# Patient Record
Sex: MALE | Race: WHITE | NOT HISPANIC OR LATINO | Employment: FULL TIME | URBAN - METROPOLITAN AREA
[De-identification: names, ages, dates, MRNs, and addresses within clinical notes are randomized per-mention and may not be internally consistent; named-entity substitution may affect disease eponyms.]

---

## 2018-01-12 ENCOUNTER — COMMUNICATION - HEALTHEAST (OUTPATIENT)
Dept: TELEHEALTH | Facility: CLINIC | Age: 33
End: 2018-01-12

## 2018-01-12 ENCOUNTER — OFFICE VISIT - HEALTHEAST (OUTPATIENT)
Dept: FAMILY MEDICINE | Facility: CLINIC | Age: 33
End: 2018-01-12

## 2018-01-12 DIAGNOSIS — Z00.00 ROUTINE GENERAL MEDICAL EXAMINATION AT A HEALTH CARE FACILITY: ICD-10-CM

## 2018-01-12 DIAGNOSIS — M79.673 HEEL PAIN: ICD-10-CM

## 2018-01-12 DIAGNOSIS — I83.90 ASYMPTOMATIC VARICOSE VEINS: ICD-10-CM

## 2018-01-12 DIAGNOSIS — Z00.00 HEALTHCARE MAINTENANCE: ICD-10-CM

## 2018-01-12 LAB
CHOLEST SERPL-MCNC: 182 MG/DL
FASTING STATUS PATIENT QL REPORTED: YES
FASTING STATUS PATIENT QL REPORTED: YES
GLUCOSE BLD-MCNC: 96 MG/DL (ref 70–99)
HDLC SERPL-MCNC: 65 MG/DL
LDLC SERPL CALC-MCNC: 109 MG/DL
TRIGL SERPL-MCNC: 41 MG/DL

## 2018-01-12 ASSESSMENT — MIFFLIN-ST. JEOR: SCORE: 1696.96

## 2018-01-15 ENCOUNTER — COMMUNICATION - HEALTHEAST (OUTPATIENT)
Dept: FAMILY MEDICINE | Facility: CLINIC | Age: 33
End: 2018-01-15

## 2018-02-14 ENCOUNTER — OFFICE VISIT - HEALTHEAST (OUTPATIENT)
Dept: VASCULAR SURGERY | Facility: CLINIC | Age: 33
End: 2018-02-14

## 2018-02-14 ENCOUNTER — RECORDS - HEALTHEAST (OUTPATIENT)
Dept: VASCULAR ULTRASOUND | Facility: CLINIC | Age: 33
End: 2018-02-14

## 2018-02-14 DIAGNOSIS — I83.892 VARICOSE VEINS OF LEFT LOWER EXTREMITY WITH OTHER COMPLICATIONS: ICD-10-CM

## 2018-02-14 DIAGNOSIS — I83.892 SYMPTOMATIC VARICOSE VEINS OF LEFT LOWER EXTREMITY: ICD-10-CM

## 2018-11-01 ENCOUNTER — OFFICE VISIT - HEALTHEAST (OUTPATIENT)
Dept: FAMILY MEDICINE | Facility: CLINIC | Age: 33
End: 2018-11-01

## 2018-11-01 DIAGNOSIS — Z71.84 TRAVEL ADVICE ENCOUNTER: ICD-10-CM

## 2019-06-10 ENCOUNTER — OFFICE VISIT - HEALTHEAST (OUTPATIENT)
Dept: FAMILY MEDICINE | Facility: CLINIC | Age: 34
End: 2019-06-10

## 2019-06-10 DIAGNOSIS — J40 BRONCHITIS: ICD-10-CM

## 2019-06-10 ASSESSMENT — MIFFLIN-ST. JEOR: SCORE: 1702.85

## 2019-06-24 ENCOUNTER — COMMUNICATION - HEALTHEAST (OUTPATIENT)
Dept: FAMILY MEDICINE | Facility: CLINIC | Age: 34
End: 2019-06-24

## 2019-07-01 ENCOUNTER — COMMUNICATION - HEALTHEAST (OUTPATIENT)
Dept: FAMILY MEDICINE | Facility: CLINIC | Age: 34
End: 2019-07-01

## 2019-07-24 ENCOUNTER — OFFICE VISIT - HEALTHEAST (OUTPATIENT)
Dept: FAMILY MEDICINE | Facility: CLINIC | Age: 34
End: 2019-07-24

## 2019-07-24 DIAGNOSIS — M25.531 RIGHT WRIST PAIN: ICD-10-CM

## 2019-07-24 NOTE — ASSESSMENT & PLAN NOTE
Exam unremarkable, some snuffbox tenderness.  Decided to refer to hand surgery.  Will defer x-ray to [place].  Discussed my concern about ligament injury and carpal bones versus possible occult navicular fracture.

## 2019-08-02 ENCOUNTER — RECORDS - HEALTHEAST (OUTPATIENT)
Dept: ADMINISTRATIVE | Facility: OTHER | Age: 34
End: 2019-08-02

## 2021-05-29 NOTE — PROGRESS NOTES
[facility] EXAM NOTE      Chief Complaint   Patient presents with     Nasal Congestion     deep chest cold about 1 month, possible allergies       ASSESSMENT & PLAN    Kelby was seen today for nasal congestion.    Diagnoses and all orders for this visit:    Bronchitis: Discussed given his lung findings today and congested cough,  exam and history most consistent with an acute bronchitis.  He is overall stable and well-appearing today.  Pulse ox is 98% and he is afebrile.  Since he has been afebrile and phlegm is clear discussed I do not think that he is appropriate for antibiotics at this time.  Will treat with burst of prednisone for 5 days.  This should take care of the symptoms.  Can continue with Mucinex as well.  Discussed if his symptoms do not improve with this treatment he can call me and I will prescribe a Z-Jermaine but that we try to avoid antibiotic use as able.  Discussed if this was due to allergies I would not expect to have those lung findings and such a persistent congested cough.  He states understanding is agreeable to plan.  Follow-up as needed.  Warning signs and symptoms including high fevers, shortness of breath or worsening cough discussed in detail.  -     predniSONE (DELTASONE) 20 MG tablet; Take 40 mg by mouth daily for 5 days.      HISTORY    Kelby Ortiz is a 33 y.o.  male who presents for the following issues:    Patient reports overall feeling okay but has had on and off cold symptoms for the last month.  Initially started off with upper respiratory cold lots of nasal congestion and then went into his chest chest cold for the first 2 weeks in May.  Then felt better for a week but then the symptoms returned.  Mostly in his chest.  Feeling better over the last week but symptoms persist.  Wife starting to worry why symptoms have been going on for a month.  Wondering about allergies?  He has never had allergies in the past.  He did try Claritin for 10 days in the middle of all the  symptoms and did not seem to do anything.  He does report an increased amount of sneezing.  Otherwise no watery eyes or itchy throat.  Still does have rhinorrhea.  He is taking DayQuil and Mucinex without much relief.  He continues to hear a rattle or wheezing in his lungs when he coughs or and when he takes a deep breath.  That he is felt for about 3 to 4 weeks at this point.  Coughing up mostly clear phlegm to a light yellowish color.  Denies any fevers.  Never got above 99.  No shortness of breath.  This weekend he actually went camping / backpacking and was overexerting himself to follow shortness breath with that but otherwise no shortness of breath.  He has never had symptoms last this long.  Little bit of plugged ears.  No sore throat.  No nausea or vomiting or diarrhea.      MEDICATIONS    Current Outpatient Medications on File Prior to Visit   Medication Sig Dispense Refill     atovaquone-proguanil (MALARONE) 250-100 mg Tab Take 1 tablet by mouth daily with breakfast. Start 2 days prior to departure.  Continue for 7  Days after return. 14 tablet 0     typhoid (VIVOTIF) SR capsule Take 1 capsule by mouth every other day. 4 capsule 0     No current facility-administered medications on file prior to visit.        Pertinent past medical, surgical, social and family history reviewed and updated in [employer].    Social History     Socioeconomic History     Marital status:      Spouse name: Not on file     Number of children: Not on file     Years of education: Not on file     Highest education level: Not on file   Occupational History     Not on file   Social Needs     Financial resource strain: Not on file     Food insecurity:     Worry: Not on file     Inability: Not on file     Transportation needs:     Medical: Not on file     Non-medical: Not on file   Tobacco Use     Smoking status: Never Smoker     Smokeless tobacco: Never Used   Substance and Sexual Activity     Alcohol use: Yes     Drug use: No      "Sexual activity: Not on file   Lifestyle     Physical activity:     Days per week: Not on file     Minutes per session: Not on file     Stress: Not on file   Relationships     Social connections:     Talks on phone: Not on file     Gets together: Not on file     Attends [employer] service: Not on file     Active member of club or organization: Not on file     Attends meetings of clubs or organizations: Not on file     Relationship status: Not on file     Intimate partner violence:     Fear of current or ex partner: Not on file     Emotionally abused: Not on file     Physically abused: Not on file     Forced sexual activity: Not on file   Other Topics Concern     Not on file   Social History Narrative     Not on file         REVIEW OF SYSTEMS     Pertinent Positives and negatives in HPI.     PHYSICAL EXAM    /64 (Patient Site: Left Arm, Patient Position: Sitting, Cuff Size: Adult Regular)   Pulse 60   Temp 98  F (36.7  C) (Oral)   Resp 20   Ht 5' 11\" (1.803 m)   Wt 163 lb 4.8 oz (74.1 kg)   SpO2 99%   BMI 22.78 kg/m    GEN:  33 y.o. male sitting comfortably in no apparent distress.  Speaking full sentences.  Looks well-hydrated.  HEENT: EOMI, no scleral icterus, buccal mucosa moist, no tonsillar erythema or exudate noted.  TMs clear with good cone light reflex.  Right turbinates a little bit pale to the left turbinates are more erythematous and boggy.  Some clear mucus/ mucopurulent congestion noted bilaterally.  Neck: Supple without lymphadenopathy  CHEST/LUNG: No respiratory distress, good air flow to bases, coarse breath sounds throughout with rhonchi noted.  No wheezing or crackles noted.  CV: RRR, S1, S2 normal; no murmurs, rubs or gallops.   SKIN: warm, dry, no rashes or lesions  NEURO: Gait normal, coordination intact  PSYCH:  Mood and affect appropriate      At the end of the encounter, I discussed results, diagnosis, medications. Discussed red flags for immediate return to clinic/ER, as well as " indications for follow up if no improvement. Patient and/or caregiver understood and agreed to plan. Patient was stable for discharge.    Fatimah Bejarano

## 2021-05-30 NOTE — PROGRESS NOTES
Assessment/ Plan  Problem List Items Addressed This Visit        Unprioritized    Right wrist pain - Primary     Exam unremarkable, some snuffbox tenderness.  Decided to refer to hand surgery.  Will defer x-ray to [place].  Discussed my concern about ligament injury and carpal bones versus possible occult navicular fracture.         Relevant Orders    Ambulatory referral to Orthopedics        Subjective  CC:  Chief Complaint   Patient presents with     Follow-up     HPI:  Kelby sent an email 6/24/2019 describing his problem  13 years ago (as a teen) I decided to show someone how to climb a tree that did not have limbs. As you can guess it ended with me falling ~10 feet out of the tree and landing on my right hand funny. At the time I had x-rays that proved it was not broke, the doctor told me to put a brace on it for a while, and my parents weren't keen on physical therapy given the situation. After a few weeks I stopped wearing a brace and it was fine as long as I did not try to bend it a full 90 degrees.     Over the years it has only occasionally hurt and was not really of concern as long as I did not bend it 90 degrees. I can comfortably bend it about 80-85 degrees but that last 5-10 degrees has always been tender. In the past year or two, I have tried to do a better job of getting in shape by riding a bike, running, push-ups, etc. and am now noticing it more than before. Especially when riding a bike or doing push-ups.     At this point, I would like to know if this is a permanent thing or if there are options for gaining a full range of motion (90-degree bend) comfortably. What do you recommend for initial steps on figuring this out?     Pain is moderate in the wrist, on the dorsum of the wrist, not off to either side in particular.    PFSH:  Patient Active Problem List   Diagnosis     Varicose Veins     Nevus Anemicus     Stye     Right wrist pain     Current medications reviewed as follows:  No current  outpatient medications on file prior to visit.     No current facility-administered medications on file prior to visit.      Social History     Tobacco Use   Smoking Status Never Smoker   Smokeless Tobacco Never Used     Social History     Social History Narrative     Not on file     Patient Care Team:  Dario Melo MD as PCP - General  ROS  Full 10 system review including constitutional, respiratory, cardiac, gi, urinary, rheumatologic, neurologic, reproductive, dermatologic psychiatric is  performed  and is otherwise negative          Objective  Physical Exam  Vitals:    07/24/19 1613   BP: 112/70   Patient Site: Right Arm   Patient Position: Sitting   Cuff Size: Adult Regular   Pulse: 74   Resp: 14   Weight: 161 lb (73 kg)     Body mass index is 22.45 kg/m .  Right wrist and hand are examined  No redness, bruising, swelling are evident on inspection  Tenderness on palpation ? : yes mild in following  Location(s): Snuffbox  No tenderness with axial pressure bilateral  rom strength intact  Sensation grossly inact  No obvious deformity  Diagnostics:   None    Addendum, patient also has questions about consult for vasectomy, referred him to Dr. Garcia      Please note: Voice recognition software was used in this dictation.  It may therefore contain typographical errors.

## 2021-05-30 NOTE — TELEPHONE ENCOUNTER
Left message #2 at [phone]. Sending letter out and postponing task out to 2 weeks and will try again if an appointment hasn't been made.

## 2021-05-31 VITALS — BODY MASS INDEX: 22.68 KG/M2 | WEIGHT: 162 LBS | HEIGHT: 71 IN

## 2021-06-02 VITALS — WEIGHT: 161.75 LBS | BODY MASS INDEX: 22.56 KG/M2

## 2021-06-03 VITALS — BODY MASS INDEX: 22.45 KG/M2 | WEIGHT: 161 LBS

## 2021-06-03 VITALS — HEIGHT: 71 IN | BODY MASS INDEX: 22.86 KG/M2 | WEIGHT: 163.3 LBS

## 2021-06-15 NOTE — PROGRESS NOTES
Adult Male Physical  A/P  1. Routine general medical examination at a health care facility  Recheck lipids, hepatitis A shot for travel which is coming up, possibly to self [place] in August.  Will make an appointment for follow-up on this.  - Lipid Cascade  - Glucose    2. Heel pain  With recurrent bruising.  Probably related to venous congestion through his extensive varicose veins.  Pain and bruising happens after running occasionally.  Recommended over-the-counter heel inserts- Shuller shoes  3. Healthcare maintenance  As above    4. Varicose Veins  Severe, referral to vascular.  Patient was there once, tried compression stockings which cause dilatation in veins above the compression stocking.  - Ambulatory referral to Vascular Center        HPI  Current Concerns/ Questions  32-year-old comes in for physical exam today.    Complains of intermittent heel pain left heel.  Happens after running, hurts for a few days.  Obvious bruise when it happens.  Disappears.  No heel pain in between.    Varicose veins on left side.  Worked up in the past, see above.  Not terribly painful now, no big problems but worries about the future as these seem to be progressing.  Viewed Dr. Henao's note      .  He and his wife might be going to [place] on vacation this summer.  The friend and his wife.  Details unsure.    Otherwise feeling well.  Works as an .  [facility] [facility]:  Current medications reviewed as follows:  Current Outpatient Prescriptions on File Prior to Visit   Medication Sig     [DISCONTINUED] codeine-guaiFENesin (GUAIFENESIN AC)  mg/5 mL liquid Take 10 mL by mouth 4 (four) times a day as needed for cough.     No current facility-administered medications on file prior to visit.      Patient Active Problem List   Diagnosis     Varicose Veins     Nevus Anemicus     Stye     No past medical history on file.  No past surgical history on file.  No family history on file.  History   Smoking Status     Never Smoker  "  Smokeless Tobacco     Never Used     Other Habits:  Alcohol/ Drug use?  No  Social History     Social History Narrative     Immunization History   Administered Date(s) Administered     DTP 1986, 03/12/1987, 05/11/1987, 05/04/1988, 10/30/1991     MMR 01/29/1988, 10/30/1991     Meningococcal MCV4P 07/06/2005     OPV,Trivalent,Historic(3805-9493 only) 1986, 03/12/1987, 05/11/1987, 05/04/1988, 10/30/1991     Tdap 03/31/2010     Varicella 01/01/1900       Body mass index is 22.59 kg/(m^2).    ROS  Full 10 system review including constitutional, respiratory, cardiac, gi, urinary, rheumatologic, neurologic, reproductive, dermatologic psychiatric is  performed (via questionnaire) and is negative      Objective  Physical Exam  Vitals:    01/12/18 0904   BP: 120/64   Pulse: (!) 58   Resp: 20   SpO2: 98%   Weight: 162 lb (73.5 kg)   Height: 5' 11\" (1.803 m)       Gen- alert and oriented x3, no acute distress  HEENT- Normocephalic atraumatic   pupils equal and reactive, EOMI.    TMs visualized and normal, ear canals normal.    Mouth moist with normal mucosa no ulceration, dentition normal or in good repair.  Neck- supple, no adenopathy or thyromegaly  Chest- Normal chest wall apperance, normal inspiration and expiration.  Clear to asculation.  CV- Regular rate and rhythm, normal tones, no murmus, gallops or rubs.  Abd-  Soft, nodistended, nontender.  Normal bowel sounds, no mass or organ enlargement.  Genitalia- normal penis, scrotum, testicles.  No hernia  Ext-severe venous varicosities left lower extremity as before.  In the foot is otherwise negative  Skin- warm and dry, no rash  Neuro- Cranial nerves grossly intact.  Normal gait, normal strength.  Reflexes symmetric.  Coordination intact.    Diagnostics                "

## 2021-06-16 PROBLEM — M25.531 RIGHT WRIST PAIN: Status: ACTIVE | Noted: 2019-07-24

## 2021-06-16 NOTE — PROGRESS NOTES
[facility] Vein Consult      Assessment:     1. varicose veins, left   2. spider veins, left   3. Insuffiencey of left greater saphenous vein, left deep vein insuffiencey  Plan:     1. Treatment options of conservative therapy of stockings use, exercise, weight loss,  elevating legs when possible.    2. Script for compression stockings 20-30 mm hg  3. Ultrasound to evaluate legs for incompetency of both deep and superficial system .   4. Surgical treatment   Endovenous closure ofleft, greater saphenous vein   Risks and benefits of surgical intervention including infection, burns, dvt,  thrombophlebitis, not closing, recurrence, numbness and nerve injury and need  for further intervention were all discused    5. Follow up: 3 months.   6. Call for any questions concerns or issues    Subjective:      Kelby Ortiz is a 32 y.o. male  who was referred by Dario Melo MD  for evaluation of varicose veins. Symptoms include pain, aching, fatigue, burning, edema and dermatitis. Patient has history of leg selling, pain and vein issues that have progressed. Pain and symptoms have affected daily living and work activities needing medications. Here for evaluation today. no stocking or compression devic use    Allergies:Penicillins    History reviewed. No pertinent past medical history.    History reviewed. No pertinent surgical history.    No current outpatient prescriptions on file.       History reviewed. No pertinent family history.     reports that he has never smoked. He has never used smokeless tobacco. He reports that he drinks alcohol. He reports that he does not use illicit drugs.      Review of Systems  Pertinent items are noted in HPI.  A 12 point comprehensive review of systems was negative except as noted.      Objective:     Vitals:    02/14/18 0859   BP: 116/62   Pulse: 76   Temp: 98  F (36.7  C)   TempSrc: Oral     There is no height or weight on file to calculate BMI.    EXAM:  GENERAL: This is a  well-developed 32 y.o. male who appears his stated age  HEAD: normocephalic  HEENT: Pupils equal and reactive bilaterally  MOUTH: mucus membranes intact. Normal dentation  CARDIAC: RRR without murmur  CHEST/LUNG:  Clear to auscultation bilaterally  ABDOMEN: Soft, nontender, nondistended, no masses noted   NEUROLOGIC: Focally intact, nonfocal, alert and oriented x 3  INTEGUMENT: No open lesions or ulcers  VASCULAR: Pulses intact, symmetrical upper and lower extremities. There areskin changes consistent with chronic venous insufficiency. Varicose veins present in left greater saphenous distribution. Spider veins present left.    Imaging:    US Venous Insufficency Leg Left (Order [id number])   Imaging   Date: 2/14/2018 Department: [facility] Ultrasound [place] Released By: Kwan Chowdhury RDMS, RVT Authorizing: Bruno Pierce MD   Study Result   [facility] OUTPATIENT  02/14/2018     EXAM: LEFT LOWER EXTREMITY DEEP AND SUPERFICIAL VENOUS DUPLEX ULTRASOUND WITH PHYSIOLOGIC TESTING     INDICATION: Symptomatic varicose veins. Assess for incompetent veins.     TECHNIQUE: Supine and upright ultrasound of the deep and superficial veins with Valsalva and compression augmentation maneuvers. Duplex imaging is performed utilizing gray-scale, two-dimensional images, color-flow imaging, Doppler waveform analysis, and   spectral Doppler imaging.      INCOMPETENCY CRITERIA: Deep vein reflux reported when greater than 1,000 ms flow reversal.  Superficial vein reflux reported when greater than 500 ms flow reversal.  vein reflux reported as greater than 350 ms flow reversal.     DEEP VEIN FINDINGS:     LEFT LEG: The common femoral, profunda femoral, femoral, popliteal, and visualized calf veins are patent and compressible.  Incompetent left common femoral vein, profunda femoral vein, femoral vein and popliteal vein. Duplicated left femoral vein from   the proximal to mid thigh is noted.     LEFT  SUPERFICIAL VEIN FINDINGS:  GREAT SAPHENOUS VEIN: Segmental incompetency of the left great saphenous vein at the saphenofemoral junction, proximal thigh, mid thigh and knee. The left great saphenous vein measures 10 mm at the saphenofemoral junction, 6 mm proximal thigh, 6 mm mid   thigh, 5 mm knee, 3 mm mid calf, 5 mm distal calf.     SMALL SAPHENOUS VEIN: Competent from the saphenopopliteal junction to the mid calf.     No incompetent perforating veins or abnormal accessory veins identified.     For comparison, the right common femoral vein is patent and compressible with normal venous waveforms and augmentation.        IMPRESSION:   CONCLUSION:   1.  No deep venous thrombosis of the left lower extremity. Incompetent left common femoral vein, profunda femoral vein, femoral vein and popliteal vein.  2.  LEFT LEG: Segmental incompetency of the left great saphenous vein at the saphenofemoral junction, proximal thigh, mid thigh and knee.         Bruno Pierce MD  [facility] Surgery Dept.

## 2021-06-17 NOTE — PATIENT INSTRUCTIONS - HE
Patient Instructions by Fatimah Bejarano DO at 6/10/2019  9:20 AM     Author: Fatimah Bejarano DO Service: -- Author Type: Physician    Filed: 6/10/2019  9:51 AM Encounter Date: 6/10/2019 Status: Signed    : Fatimah Bejarano DO (Physician)       Patient Education     Acute Bronchitis  Your healthcare provider has told you that you have acute bronchitis. Bronchitis is infection or inflammation of the bronchial tubes (airways in the lungs). Normally, air moves easily in and out of the airways. Bronchitis narrows the airways, making it harder for air to flow in and out of the lungs. This causes symptoms such as shortness of breath, coughing up yellow or green mucus, and wheezing. Bronchitis can be acute or chronic. Acute means the condition comes on quickly and goes away in a short time, usually within 3 to 10 days. Chronic means a condition lasts a long time and often comes back.    What causes acute bronchitis?  Acute bronchitis almost always starts as a viral respiratory infection, such as a cold or the flu. Certain factors make it more likely for a cold or flu to turn into bronchitis. These include being very young, being elderly, having a heart or lung problem, or having a weak immune system. Cigarette smoking also makes bronchitis more likely.  When bronchitis develops, the airways become swollen. The airways may also become infected with bacteria. This is known as a secondary infection.  Diagnosing acute bronchitis  Your healthcare provider will examine you and ask about your symptoms and health history. You may also have a sputum culture to test the fluid in your lungs. Chest X-rays may be done to look for infection in the lungs.  Treating acute bronchitis  Bronchitis usually clears up as the cold or flu goes away. You can help feel better faster by doing the following:    Take medicine as directed. You may be told to take ibuprofen or other over-the-counter medicines. These help relieve inflammation in your  bronchial tubes. Your healthcare provider may prescribe an inhaler to help open up the bronchial tubes. Most of the time, acute bronchitis is caused by a viral infection. Antibiotics are usually not prescribed for viral infections.    Drink plenty of fluids, such as water, juice, or warm soup. Fluids loosen mucus so that you can cough it up. This helps you breathe more easily. Fluids also prevent dehydration.    Make sure you get plenty of rest.    Do not smoke. Do not allow anyone else to smoke in your home.  Recovery and follow-up  Follow up with your doctor as you are told. You will likely feel better in a week or two. But a dry cough can linger beyond that time. Let your doctor know if you still have symptoms (other than a dry cough) after 2 weeks, or if youre prone to getting bronchial infections. Take steps to protect yourself from future infections. These steps include stopping smoking and avoiding tobacco smoke, washing your hands often, and getting a yearly flu shot.  When to call your healthcare provider  Call the healthcare provider if you have any of the following:    Fever of 100.4 F (38.0 C) or higher, or as advised    Symptoms that get worse, or new symptoms    Trouble breathing    Symptoms that dont start to improve within a week, or within 3 days of taking antibiotics   Date Last Reviewed: 12/1/2016 2000-2017 [employer]. [address] [place] [address], [place] [address]. All rights reserved. This information is not intended as a substitute for professional medical care. Always follow your healthcare professional's instructions.

## 2021-06-19 NOTE — LETTER
Letter by Dario Melo MD at      Author: Dario Melo MD Service: -- Author Type: --    Filed:  Encounter Date: 7/1/2019 Status: (Other)         Kelby Ortiz  [address] [address]      July 1, 2019      Dear Kelby,    As a valued [facility] patient, your healthcare needs are our priority. We have been trying to get a hold of you to help you schedule a appointment to come in to be seen with Dr. Melo regarding your message you had send to Dr. Melo.     To help prevent delays in your care, please call the [facility] at [phone].    We look forward to partnering with you to achieve optimal health and wellbeing.    Sincerely,  Your care team at [facility] and [facility]

## 2021-06-21 NOTE — PROGRESS NOTES
Travel Advice Consultation  15 minutes spent, > 50% counseling regarding upcoming travel  And his wife have friends who 1 trip to [place].  Purpose is to resume.    Dates of upcoming visit: leaving 2/2 for 2 weeks (13 days  Countries/ region of visit: [place] only  Rural regions visited? [place]- this is only area that is at risk for malaria  Activities anticipated?Tourism  Any recent out of the country travel? no      Checklist of needs:  Special considerations with medical history? no  (Zika warning for women of childbearing age, and men due to sexual transmission potential)  Due for any routine immunizations? Hep A booster  Travel specific immunizations:  -Typhoid?  Yes- discussed and opted for oral  -Other:: no  Malaria prophylaxis ? Yes, only time when on safari is at risk/[place]one for 14 days- 2 before, 5 during, 7 after  Traveler's Diarrhea: yes/ciprp    Consultation:  Arthropod -borne disease risk reduction  - Insect Repellant with DEET or picardin  -consider permethrin to clothing/ insect nets with permethrin where malaria  -hand washing for traveler's diarrhea  -cation with water  -carry along loperamide for diarrhea/ when to use antibiotic  -motor vehicle accidents are the the most serious cause of death and major injury  -moving legs on long flights to prevent DVTs      Hep A give

## 2021-06-26 NOTE — PROGRESS NOTES
Progress Notes by Jenny Nicholas RN at 2/14/2018  9:00 AM     Author: Jenny Nicholas RN Service: -- Author Type: Registered Nurse    Filed: 2/14/2018  2:48 PM Encounter Date: 2/14/2018 Status: Signed    : Jenny Nicholas RN (Registered Nurse)           Left medial leg 2/14        Left leg 2/14    Had injury in 2003 of left leg. Wondering if varicose veins are from the injury. Compression stocking irritated around knee area in past.

## 2021-08-06 ENCOUNTER — OFFICE VISIT (OUTPATIENT)
Dept: FAMILY MEDICINE | Facility: CLINIC | Age: 36
End: 2021-08-06
Payer: COMMERCIAL

## 2021-08-06 VITALS
TEMPERATURE: 97.5 F | BODY MASS INDEX: 22.26 KG/M2 | HEART RATE: 60 BPM | SYSTOLIC BLOOD PRESSURE: 115 MMHG | DIASTOLIC BLOOD PRESSURE: 74 MMHG | HEIGHT: 71 IN | RESPIRATION RATE: 17 BRPM | WEIGHT: 159 LBS

## 2021-08-06 DIAGNOSIS — D22.5 MELANOCYTIC NEVUS OF TRUNK: ICD-10-CM

## 2021-08-06 DIAGNOSIS — Z00.00 HEALTHCARE MAINTENANCE: Primary | ICD-10-CM

## 2021-08-06 DIAGNOSIS — Z80.0 FAMILY HISTORY OF COLON CANCER: ICD-10-CM

## 2021-08-06 DIAGNOSIS — I83.90 ASYMPTOMATIC VARICOSE VEINS: ICD-10-CM

## 2021-08-06 PROCEDURE — 99395 PREV VISIT EST AGE 18-39: CPT | Mod: 25 | Performed by: FAMILY MEDICINE

## 2021-08-06 PROCEDURE — 90714 TD VACC NO PRESV 7 YRS+ IM: CPT | Performed by: FAMILY MEDICINE

## 2021-08-06 PROCEDURE — 90471 IMMUNIZATION ADMIN: CPT | Performed by: FAMILY MEDICINE

## 2021-08-06 RX ORDER — CYCLOSPORINE 0.5 MG/ML
EMULSION OPHTHALMIC
COMMUNITY
Start: 2021-06-24

## 2021-08-06 ASSESSMENT — MIFFLIN-ST. JEOR: SCORE: 1687.47

## 2021-08-06 NOTE — ASSESSMENT & PLAN NOTE
Healthcare maintenance assessment  Immunization-has had Covid shots, tetanus shot given, recommend flu when available  Cancer screening-begin colon cancer screening at age 41 based on 2 second-degree relatives, maternal aunts and grandfather  Vascular-blood pressure good, exercising regularly  Plan fasting cholesterol next physical exam.  Last cholesterol done 3 years ago  Other-we will defer hepatitis C and HIV for now, very low risk

## 2021-08-06 NOTE — ASSESSMENT & PLAN NOTE
Severe, left lower extremity  Patient does better with exercise,  Wore compression stocking for a time but caused more proximal problems  See a vein specialist occasional

## 2021-08-06 NOTE — PROGRESS NOTES
Adult Male Physical  A/P  Varicose Veins  Severe, left lower extremity  Patient does better with exercise,  Wore compression stocking for a time but caused more proximal problems  See a vein specialist occasional      Healthcare maintenance  Healthcare maintenance assessment  Immunization-has had Covid shots, tetanus shot given, recommend flu when available  Cancer screening-begin colon cancer screening at age 40 based on 2 second-degree relatives, maternal aunts and grandfather  Vascular-blood pressure good, exercising regularly  Plan fasting cholesterol next physical exam.  Last cholesterol done 3 years ago  Other-we will defer hepatitis C and HIV for now, very low risk        Melanocytic nevus of trunk  Normal expanding on abdomen, definitely bigger than the other side.  Will see him for excision    Family history of colon cancer  Maternal aunt and uncle and maternal grandfather.  Plan first colonoscopy at 41           HPI  Current Concerns/ Questions  35-year-old  Here for physical exam  Concerned about a mole which is a little darker on his abdomen  Wishes to have a referral for vasectomy.  Wife has some endocrine issues that would be difficult to conceive.  She also anxious and considering being on some medications for this in which pregnancy would be contraindicated.  Patient desires vasectomy to allow her to take medication she needs.    Also, severe varicose vein problems with venous reflux left lower extremity.  See note above    Discussed family history of colon cancer      PFSH:  Current medications reviewed as follows:    Patient Active Problem List   Diagnosis     Varicose Veins     Nevus Anemicus     Stye     Right wrist pain     Healthcare maintenance     Melanocytic nevus of trunk     Family history of colon cancer     No past medical history on file.  No past surgical history on file.  No family history on file.  History   Smoking Status     Never Smoker   Smokeless Tobacco     Never Used     Other  "Habits:  Alcohol/ Drug use?  Not drinking at all since Covid.  No other drugs  Social History     Social History Narrative     Not on file     Immunization History   Administered Date(s) Administered     COVID-19,PF,Moderna 03/18/2021, 04/16/2021     HepA-Adult 01/12/2018, 11/01/2018     Historical DTP/aP 1986, 03/12/1987, 05/11/1987, 05/04/1988, 10/30/1991     MMR 01/29/1988, 10/30/1991     Meningococcal (Menactra ) 07/06/2005     OPV, trivalent, live 1986, 03/12/1987, 05/11/1987, 05/04/1988, 10/30/1991     TD (ADULT, 7+) 08/06/2021     Tdap (Adacel,Boostrix) 03/31/2010     Body mass index is 22.01 kg/m .    ROS  As above    Objective  Physical Exam  Vitals:    08/06/21 1549   BP: 115/74   BP Location: Left arm   Patient Position: Sitting   Cuff Size: Adult Regular   Pulse: 60   Resp: 17   Temp: 97.5  F (36.4  C)   TempSrc: Temporal   Weight: 72.1 kg (159 lb)   Height: 1.81 m (5' 11.26\")       Gen- alert and oriented x3, no acute distress  HEENT- Normocephalic atraumatic   pupils equal and reactive, EOMI.    TMs visualized and normal, ear canals normal.    Mouth moist with normal mucosa no ulceration, dentition normal or in good repair.  Neck- supple, no adenopathy or thyromegaly  Chest- Normal chest wall apperance, normal inspiration and expiration.  Clear to asculation.  CV- Regular rate and rhythm, normal tones, no murmus, gallops or rubs.  Abd-  Soft, nodistended, nontender.  Normal bowel sounds, no mass or organ enlargement.    Ext- Atraumatic, severe venous varicosities left leg, mainly saphenous system  Skin- warm and dry, no rash  Circular pigmented nevus on mid abdomen stands out.  Its as large as the head of an eraser and dark but no other high risk characteristics.      Diagnostics  None                Answers for HPI/ROS submitted by the patient on 8/6/2021  Frequency of exercise:: 4-5 days/week  Getting at least 3 servings of Calcium per day:: Yes  Diet:: Regular (no restrictions)  Taking " medications regularly:: Not Applicable  Medication side effects:: Not applicable  Bi-annual eye exam:: Yes  Dental care twice a year:: Yes  Sleep apnea or symptoms of sleep apnea:: None  Additional concerns today:: No  Duration of exercise:: 30-45 minutes

## 2021-10-17 ENCOUNTER — HEALTH MAINTENANCE LETTER (OUTPATIENT)
Age: 36
End: 2021-10-17

## 2022-10-01 ENCOUNTER — HEALTH MAINTENANCE LETTER (OUTPATIENT)
Age: 37
End: 2022-10-01

## 2023-10-21 ENCOUNTER — HEALTH MAINTENANCE LETTER (OUTPATIENT)
Age: 38
End: 2023-10-21

## 2024-06-05 SDOH — HEALTH STABILITY: PHYSICAL HEALTH: ON AVERAGE, HOW MANY MINUTES DO YOU ENGAGE IN EXERCISE AT THIS LEVEL?: 40 MIN

## 2024-06-05 SDOH — HEALTH STABILITY: PHYSICAL HEALTH: ON AVERAGE, HOW MANY DAYS PER WEEK DO YOU ENGAGE IN MODERATE TO STRENUOUS EXERCISE (LIKE A BRISK WALK)?: 4 DAYS

## 2024-06-05 ASSESSMENT — SOCIAL DETERMINANTS OF HEALTH (SDOH): HOW OFTEN DO YOU GET TOGETHER WITH FRIENDS OR RELATIVES?: TWICE A WEEK

## 2024-06-06 ENCOUNTER — OFFICE VISIT (OUTPATIENT)
Dept: FAMILY MEDICINE | Facility: CLINIC | Age: 39
End: 2024-06-06
Payer: COMMERCIAL

## 2024-06-06 VITALS
OXYGEN SATURATION: 99 % | HEIGHT: 71 IN | TEMPERATURE: 97.5 F | WEIGHT: 164 LBS | RESPIRATION RATE: 16 BRPM | SYSTOLIC BLOOD PRESSURE: 111 MMHG | DIASTOLIC BLOOD PRESSURE: 72 MMHG | HEART RATE: 62 BPM | BODY MASS INDEX: 22.96 KG/M2

## 2024-06-06 DIAGNOSIS — Z00.00 HEALTHCARE MAINTENANCE: Primary | ICD-10-CM

## 2024-06-06 DIAGNOSIS — I83.90 ASYMPTOMATIC VARICOSE VEINS: ICD-10-CM

## 2024-06-06 DIAGNOSIS — D22.5 MELANOCYTIC NEVUS OF TRUNK: ICD-10-CM

## 2024-06-06 LAB — GLUCOSE BLD-MCNC: 94 MG/DL (ref 60–99)

## 2024-06-06 PROCEDURE — 99395 PREV VISIT EST AGE 18-39: CPT | Mod: 25 | Performed by: FAMILY MEDICINE

## 2024-06-06 PROCEDURE — 84460 ALANINE AMINO (ALT) (SGPT): CPT | Performed by: FAMILY MEDICINE

## 2024-06-06 PROCEDURE — 99213 OFFICE O/P EST LOW 20 MIN: CPT | Mod: 25 | Performed by: FAMILY MEDICINE

## 2024-06-06 PROCEDURE — 90746 HEPB VACCINE 3 DOSE ADULT IM: CPT | Performed by: FAMILY MEDICINE

## 2024-06-06 PROCEDURE — 80061 LIPID PANEL: CPT | Performed by: FAMILY MEDICINE

## 2024-06-06 PROCEDURE — 36415 COLL VENOUS BLD VENIPUNCTURE: CPT | Performed by: FAMILY MEDICINE

## 2024-06-06 PROCEDURE — 90471 IMMUNIZATION ADMIN: CPT | Performed by: FAMILY MEDICINE

## 2024-06-06 PROCEDURE — 82947 ASSAY GLUCOSE BLOOD QUANT: CPT | Performed by: FAMILY MEDICINE

## 2024-06-06 NOTE — PROGRESS NOTES
Adult Male Physical  A/P  Varicose Veins  Severe, left leg.  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  Wore knee-high compression stockings for a while but then started getting varicose veins of both of them  Discussed trial of thigh-high stocking on the left  Offered referral back to vascular but we will wait for now.    Melanocytic nevus of trunk  I was more concerned about this in the past, borderline size but otherwise uniform color, regular borders.  Patient watching for any changes.  No family history of melanoma    Healthcare maintenance  Desires hepatitis B, will plan COVID with flu shot in the fall.  Lipid and glucose ordered         HPI  Current Concerns/ Questions  38-year-old, here for physical exam.  No particular questions.  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  History of melanocytic nevus on trunk I was concerned about last year, see discussion above.  History of severe varicose veins left leg.  Family history of this as well, saw vascular couple of years ago.  Family history of colon cancer, plan colonoscopy at age 40.  Had second thoughts and is having second thoughts about vasectomy.  He and his wife are 37, not quite sure as they were before about children.  PFSH:  Current medications reviewed as follows:  Current Outpatient Medications   Medication Sig Dispense Refill    RESTASIS 0.05 % ophthalmic emulsion INSTILL 1 DROP INTO BOTH EYES TWICE A DAY       No current facility-administered medications for this visit.      Patient Active Problem List   Diagnosis    Varicose Veins    Nevus Anemicus    Stye    Right wrist pain    Healthcare maintenance    Melanocytic nevus of trunk    Family history of colon cancer     No past medical history on file.  No past surgical history on file.  Family History   Problem Relation Age of Onset    Colon Cancer Maternal Grandfather      "Colon Cancer Maternal Aunt     Cerebrovascular Disease Maternal Grandmother     Diabetes Paternal Grandfather      History   Smoking Status    Never   Smokeless Tobacco    Never       Social History     Social History Narrative    Not on file     Immunization History   Administered Date(s) Administered    COVID-19 Monovalent 18+ (Moderna) 03/18/2021, 04/16/2021, 12/22/2021    Hepatitis A (ADULT 19+) 01/12/2018, 11/01/2018    Historical DTP/aP 1986, 03/12/1987, 05/11/1987, 05/04/1988, 10/30/1991    Influenza Vaccine >6 months,quad, PF 10/23/2021    MMR 01/29/1988, 10/30/1991    Meningococcal ACWY (Menactra ) 07/06/2005    OPV, trivalent, live 1986, 03/12/1987, 05/11/1987, 05/04/1988, 10/30/1991    TD,PF 7+ (Tenivac) 08/06/2021    TDAP (Adacel,Boostrix) 03/31/2010     Body mass index is 22.71 kg/m .        Objective  Physical Exam  Vitals:    06/06/24 0741   BP: 111/72   BP Location: Right arm   Patient Position: Sitting   Cuff Size: Adult Regular   Pulse: 62   Resp: 16   Temp: 97.5  F (36.4  C)   TempSrc: Temporal   SpO2: 99%   Weight: 74.4 kg (164 lb)   Height: 1.81 m (5' 11.26\")       Gen- alert and oriented x3, no acute distress  HEENT- Normocephalic atraumatic   pupils equal and reactive, EOMI.    TMs visualized and normal, ear canals normal.    Mouth moist with normal mucosa no ulceration, dentition normal or in good repair.  Neck- supple, no adenopathy or thyromegaly  Chest- Normal chest wall apperance, normal inspiration and expiration.  Clear to asculation.  CV- Regular rate and rhythm, normal tones, no murmus, gallops or rubs.  Abd-  Soft, nodistended, nontender.  Normal bowel sounds, no mass or organ enlargement.  Genitalia-  was not done  NELY: was not done  Ext- Atraumatic,  No synovial thickening. Good perfusion, no edema. Periph pulses detected  Skin- warm and dry, no rash  A number of typical pigmented nevi on back  1 very slightly larger on abdomen  Melanocytic nevus " descrition  Location-right upper quadrant of the abdomen  Asymmetry?  No  Irregular borders?  No  Varying color shades?  No  Diameter estimated greater than 6 mm-larger than head of an eraser?  Borderline, perhaps slightly smaller       Neuro- Cranial nerves grossly intact.  Normal gait, normal strength.  Coordination intact.    Diagnostics  Pending      Prior to immunization administration, verified patients identity using patient s name and date of birth. Please see Immunization Activity for additional information.     Screening Questionnaire for Adult Immunization    Are you sick today?   No   Do you have allergies to medications, food, a vaccine component or latex?   Yes   Have you ever had a serious reaction after receiving a vaccination?   No   Do you have a long-term health problem with heart, lung, kidney, or metabolic disease (e.g., diabetes), asthma, a blood disorder, no spleen, complement component deficiency, a cochlear implant, or a spinal fluid leak?  Are you on long-term aspirin therapy?   No   Do you have cancer, leukemia, HIV/AIDS, or any other immune system problem?   No   Do you have a parent, brother, or sister with an immune system problem?   No   In the past 3 months, have you taken medications that affect  your immune system, such as prednisone, other steroids, or anticancer drugs; drugs for the treatment of rheumatoid arthritis, Crohn s disease, or psoriasis; or have you had radiation treatments?   No   Have you had a seizure, or a brain or other nervous system problem?   No   During the past year, have you received a transfusion of blood or blood    products, or been given immune (gamma) globulin or antiviral drug?   No   For women: Are you pregnant or is there a chance you could become       pregnant during the next month?   No   Have you received any vaccinations in the past 4 weeks?   No     Immunization questionnaire was positive for at least one answer.  Notified provider.      Patient  instructed to remain in clinic for 15 minutes afterwards, and to report any adverse reactions.     Screening performed by Shemar Whitten MA on 6/6/2024 at 7:43 AM.

## 2024-06-06 NOTE — PROGRESS NOTES
DME (Durable Medical Equipment) Orders and Documentation  Orders Placed This Encounter   Procedures     Orthotics, Mastectomy and Custom Compression Orders        The patient was assessed and it was determined the patient is in need of the following listed DME Supplies/Equipment. Please complete supporting documentation below to demonstrate medical necessity.

## 2024-06-06 NOTE — ASSESSMENT & PLAN NOTE
Severe, left leg.  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  Wore knee-high compression stockings for a while but then started getting varicose veins of both of them  Discussed trial of thigh-high stocking on the left  Offered referral back to vascular but we will wait for now.

## 2024-06-06 NOTE — ASSESSMENT & PLAN NOTE
I was more concerned about this in the past, borderline size but otherwise uniform color, regular borders.  Patient watching for any changes.  No family history of melanoma

## 2024-06-07 LAB
ALT SERPL W P-5'-P-CCNC: 19 U/L (ref 0–70)
CHOLEST SERPL-MCNC: 173 MG/DL
FASTING STATUS PATIENT QL REPORTED: YES
HDLC SERPL-MCNC: 62 MG/DL
LDLC SERPL CALC-MCNC: 98 MG/DL
NONHDLC SERPL-MCNC: 111 MG/DL
TRIGL SERPL-MCNC: 63 MG/DL

## 2024-07-15 ENCOUNTER — ALLIED HEALTH/NURSE VISIT (OUTPATIENT)
Dept: FAMILY MEDICINE | Facility: CLINIC | Age: 39
End: 2024-07-15
Payer: COMMERCIAL

## 2024-07-15 VITALS — TEMPERATURE: 98.3 F

## 2024-07-15 DIAGNOSIS — Z23 ENCOUNTER FOR IMMUNIZATION: Primary | ICD-10-CM

## 2024-07-15 PROCEDURE — 99207 PR NO CHARGE NURSE ONLY: CPT

## 2024-07-15 PROCEDURE — 90471 IMMUNIZATION ADMIN: CPT

## 2024-07-15 PROCEDURE — 90746 HEPB VACCINE 3 DOSE ADULT IM: CPT

## 2024-07-15 NOTE — PROGRESS NOTES
Prior to immunization administration, verified patients identity using patient s name and date of birth. Please see Immunization Activity for additional information.     Is the patient's temperature normal (100.5 or less)? Yes     Patient MEETS CRITERIA. PROCEED with vaccine administration.          7/15/2024   Hepatitis B   Have you had a serious reaction to a hepatitis B vaccine or to something in a hepatitis B vaccine, including yeast)? No   Are you getting kidney dialysis (either peritoneal or hemodialysis)? No   Do you have an allergy to latex? No            Patient MEETS CRITERIA. PROCEED with vaccine administration.        Patient instructed to remain in clinic for 15 minutes afterwards, and to report any adverse reactions.      Link to Ancillary Visit Immunization Standing Orders SmartSet     Screening performed by Merritt Sheets MA on 7/15/2024 at 9:00 AM.

## 2025-05-07 ENCOUNTER — PATIENT OUTREACH (OUTPATIENT)
Dept: CARE COORDINATION | Facility: CLINIC | Age: 40
End: 2025-05-07
Payer: COMMERCIAL

## 2025-05-21 ENCOUNTER — PATIENT OUTREACH (OUTPATIENT)
Dept: CARE COORDINATION | Facility: CLINIC | Age: 40
End: 2025-05-21
Payer: COMMERCIAL

## 2025-07-20 ENCOUNTER — HEALTH MAINTENANCE LETTER (OUTPATIENT)
Age: 40
End: 2025-07-20